# Patient Record
Sex: MALE | Race: WHITE | Employment: FULL TIME | URBAN - METROPOLITAN AREA
[De-identification: names, ages, dates, MRNs, and addresses within clinical notes are randomized per-mention and may not be internally consistent; named-entity substitution may affect disease eponyms.]

---

## 2018-07-02 ENCOUNTER — ANESTHESIA EVENT (OUTPATIENT)
Dept: SURGERY | Age: 28
End: 2018-07-02
Payer: COMMERCIAL

## 2018-07-03 ENCOUNTER — HOSPITAL ENCOUNTER (OUTPATIENT)
Age: 28
Setting detail: OUTPATIENT SURGERY
Discharge: HOME | End: 2018-07-03
Attending: ORTHOPAEDIC SURGERY | Admitting: ORTHOPAEDIC SURGERY
Payer: COMMERCIAL

## 2018-07-03 ENCOUNTER — APPOINTMENT (OUTPATIENT)
Dept: GENERAL RADIOLOGY | Age: 28
End: 2018-07-03
Attending: ORTHOPAEDIC SURGERY
Payer: COMMERCIAL

## 2018-07-03 ENCOUNTER — ANESTHESIA (OUTPATIENT)
Dept: SURGERY | Age: 28
End: 2018-07-03
Payer: COMMERCIAL

## 2018-07-03 VITALS
SYSTOLIC BLOOD PRESSURE: 129 MMHG | DIASTOLIC BLOOD PRESSURE: 81 MMHG | OXYGEN SATURATION: 96 % | TEMPERATURE: 97.6 F | RESPIRATION RATE: 16 BRPM | HEART RATE: 85 BPM | BODY MASS INDEX: 25.84 KG/M2 | WEIGHT: 175 LBS

## 2018-07-03 DIAGNOSIS — G89.18 POST-OP PAIN: Primary | ICD-10-CM

## 2018-07-03 PROCEDURE — C1713 ANCHOR/SCREW BN/BN,TIS/BN: HCPCS | Performed by: ORTHOPAEDIC SURGERY

## 2018-07-03 PROCEDURE — 77030020143 HC AIRWY LARYN INTUB CGAS -A: Performed by: ANESTHESIOLOGY

## 2018-07-03 PROCEDURE — 77030003602 HC NDL NRV BLK BBMI -B: Performed by: ANESTHESIOLOGY

## 2018-07-03 PROCEDURE — 74011000250 HC RX REV CODE- 250

## 2018-07-03 PROCEDURE — 77030003737 HC BIT DRL ACMD -C: Performed by: ORTHOPAEDIC SURGERY

## 2018-07-03 PROCEDURE — 76060000035 HC ANESTHESIA 2 TO 2.5 HR: Performed by: ORTHOPAEDIC SURGERY

## 2018-07-03 PROCEDURE — 77030002933 HC SUT MCRYL J&J -A: Performed by: ORTHOPAEDIC SURGERY

## 2018-07-03 PROCEDURE — 74011250636 HC RX REV CODE- 250/636

## 2018-07-03 PROCEDURE — 76210000020 HC REC RM PH II FIRST 0.5 HR: Performed by: ORTHOPAEDIC SURGERY

## 2018-07-03 PROCEDURE — 77030018836 HC SOL IRR NACL ICUM -A: Performed by: ORTHOPAEDIC SURGERY

## 2018-07-03 PROCEDURE — 77030016570 HC BLNKT BAIR HGGR 3M -B: Performed by: ANESTHESIOLOGY

## 2018-07-03 PROCEDURE — 74011250636 HC RX REV CODE- 250/636: Performed by: ORTHOPAEDIC SURGERY

## 2018-07-03 PROCEDURE — 76942 ECHO GUIDE FOR BIOPSY: CPT | Performed by: ORTHOPAEDIC SURGERY

## 2018-07-03 PROCEDURE — 74011250636 HC RX REV CODE- 250/636: Performed by: ANESTHESIOLOGY

## 2018-07-03 PROCEDURE — 77030002966 HC SUT PDS J&J -A: Performed by: ORTHOPAEDIC SURGERY

## 2018-07-03 PROCEDURE — 77030032490 HC SLV COMPR SCD KNE COVD -B: Performed by: ORTHOPAEDIC SURGERY

## 2018-07-03 PROCEDURE — 73000 X-RAY EXAM OF COLLAR BONE: CPT

## 2018-07-03 PROCEDURE — 77030018673: Performed by: ORTHOPAEDIC SURGERY

## 2018-07-03 PROCEDURE — 76010000171 HC OR TIME 2 TO 2.5 HR INTENSV-TIER 1: Performed by: ORTHOPAEDIC SURGERY

## 2018-07-03 PROCEDURE — 76210000063 HC OR PH I REC FIRST 0.5 HR: Performed by: ORTHOPAEDIC SURGERY

## 2018-07-03 PROCEDURE — 77030011640 HC PAD GRND REM COVD -A: Performed by: ORTHOPAEDIC SURGERY

## 2018-07-03 PROCEDURE — 76010010054 HC POST OP PAIN BLOCK: Performed by: ORTHOPAEDIC SURGERY

## 2018-07-03 DEVICE — DBX MIX, 2.5CC
Type: IMPLANTABLE DEVICE | Site: CLAVICLE | Status: FUNCTIONAL
Brand: DBX®

## 2018-07-03 DEVICE — 3.5MM X 16MM NON-LOCKING HEXALOBE SCREW
Type: IMPLANTABLE DEVICE | Site: CLAVICLE | Status: FUNCTIONAL
Brand: ACUMED

## 2018-07-03 DEVICE — 3.5MM X 12MM LOCKING HEXALOBE SCREW
Type: IMPLANTABLE DEVICE | Site: CLAVICLE | Status: FUNCTIONAL
Brand: ACUMED

## 2018-07-03 DEVICE — 3.5MM X 14MM LOCKING HEXALOBE SCREW
Type: IMPLANTABLE DEVICE | Site: CLAVICLE | Status: FUNCTIONAL
Brand: ACUMED

## 2018-07-03 DEVICE — 3.5MM X 12MM NON-LOCKING HEXALOBE SCREW
Type: IMPLANTABLE DEVICE | Site: CLAVICLE | Status: FUNCTIONAL
Brand: ACUMED

## 2018-07-03 DEVICE — NRW PROF CLAVICLE PLATE, 8-HOLE STR RT
Type: IMPLANTABLE DEVICE | Site: CLAVICLE | Status: FUNCTIONAL
Brand: ACUMED

## 2018-07-03 RX ORDER — MIDAZOLAM HYDROCHLORIDE 1 MG/ML
2 INJECTION, SOLUTION INTRAMUSCULAR; INTRAVENOUS
Status: DISCONTINUED | OUTPATIENT
Start: 2018-07-03 | End: 2018-07-03 | Stop reason: HOSPADM

## 2018-07-03 RX ORDER — KETOROLAC TROMETHAMINE 10 MG/1
10 TABLET, FILM COATED ORAL EVERY 6 HOURS
Qty: 15 TAB | Refills: 0 | Status: SHIPPED | OUTPATIENT
Start: 2018-07-03

## 2018-07-03 RX ORDER — SODIUM CHLORIDE 0.9 % (FLUSH) 0.9 %
5-10 SYRINGE (ML) INJECTION AS NEEDED
Status: DISCONTINUED | OUTPATIENT
Start: 2018-07-03 | End: 2018-07-03 | Stop reason: HOSPADM

## 2018-07-03 RX ORDER — MIDAZOLAM HYDROCHLORIDE 1 MG/ML
2 INJECTION, SOLUTION INTRAMUSCULAR; INTRAVENOUS ONCE
Status: COMPLETED | OUTPATIENT
Start: 2018-07-03 | End: 2018-07-03

## 2018-07-03 RX ORDER — FENTANYL CITRATE 50 UG/ML
INJECTION, SOLUTION INTRAMUSCULAR; INTRAVENOUS AS NEEDED
Status: DISCONTINUED | OUTPATIENT
Start: 2018-07-03 | End: 2018-07-03 | Stop reason: HOSPADM

## 2018-07-03 RX ORDER — SODIUM CHLORIDE, SODIUM LACTATE, POTASSIUM CHLORIDE, CALCIUM CHLORIDE 600; 310; 30; 20 MG/100ML; MG/100ML; MG/100ML; MG/100ML
100 INJECTION, SOLUTION INTRAVENOUS CONTINUOUS
Status: DISCONTINUED | OUTPATIENT
Start: 2018-07-03 | End: 2018-07-03 | Stop reason: HOSPADM

## 2018-07-03 RX ORDER — PROPOFOL 10 MG/ML
INJECTION, EMULSION INTRAVENOUS AS NEEDED
Status: DISCONTINUED | OUTPATIENT
Start: 2018-07-03 | End: 2018-07-03 | Stop reason: HOSPADM

## 2018-07-03 RX ORDER — BUPIVACAINE HYDROCHLORIDE AND EPINEPHRINE 5; 5 MG/ML; UG/ML
INJECTION, SOLUTION EPIDURAL; INTRACAUDAL; PERINEURAL AS NEEDED
Status: DISCONTINUED | OUTPATIENT
Start: 2018-07-03 | End: 2018-07-03 | Stop reason: HOSPADM

## 2018-07-03 RX ORDER — FENTANYL CITRATE 50 UG/ML
100 INJECTION, SOLUTION INTRAMUSCULAR; INTRAVENOUS ONCE
Status: COMPLETED | OUTPATIENT
Start: 2018-07-03 | End: 2018-07-03

## 2018-07-03 RX ORDER — SODIUM CHLORIDE 0.9 % (FLUSH) 0.9 %
5-10 SYRINGE (ML) INJECTION EVERY 8 HOURS
Status: DISCONTINUED | OUTPATIENT
Start: 2018-07-03 | End: 2018-07-03 | Stop reason: HOSPADM

## 2018-07-03 RX ORDER — DIPHENHYDRAMINE HYDROCHLORIDE 50 MG/ML
12.5 INJECTION, SOLUTION INTRAMUSCULAR; INTRAVENOUS
Status: DISCONTINUED | OUTPATIENT
Start: 2018-07-03 | End: 2018-07-03 | Stop reason: HOSPADM

## 2018-07-03 RX ORDER — ACETAMINOPHEN 500 MG
1000 TABLET ORAL ONCE
Status: DISCONTINUED | OUTPATIENT
Start: 2018-07-03 | End: 2018-07-03 | Stop reason: HOSPADM

## 2018-07-03 RX ORDER — ONDANSETRON 2 MG/ML
INJECTION INTRAMUSCULAR; INTRAVENOUS AS NEEDED
Status: DISCONTINUED | OUTPATIENT
Start: 2018-07-03 | End: 2018-07-03 | Stop reason: HOSPADM

## 2018-07-03 RX ORDER — OXYCODONE HYDROCHLORIDE 5 MG/1
10 TABLET ORAL
Status: DISCONTINUED | OUTPATIENT
Start: 2018-07-03 | End: 2018-07-03 | Stop reason: HOSPADM

## 2018-07-03 RX ORDER — EPHEDRINE SULFATE 50 MG/ML
INJECTION, SOLUTION INTRAVENOUS AS NEEDED
Status: DISCONTINUED | OUTPATIENT
Start: 2018-07-03 | End: 2018-07-03 | Stop reason: HOSPADM

## 2018-07-03 RX ORDER — LIDOCAINE HYDROCHLORIDE 20 MG/ML
INJECTION, SOLUTION EPIDURAL; INFILTRATION; INTRACAUDAL; PERINEURAL AS NEEDED
Status: DISCONTINUED | OUTPATIENT
Start: 2018-07-03 | End: 2018-07-03 | Stop reason: HOSPADM

## 2018-07-03 RX ORDER — DEXAMETHASONE SODIUM PHOSPHATE 4 MG/ML
INJECTION, SOLUTION INTRA-ARTICULAR; INTRALESIONAL; INTRAMUSCULAR; INTRAVENOUS; SOFT TISSUE AS NEEDED
Status: DISCONTINUED | OUTPATIENT
Start: 2018-07-03 | End: 2018-07-03 | Stop reason: HOSPADM

## 2018-07-03 RX ORDER — HYDROMORPHONE HYDROCHLORIDE 2 MG/1
2-4 TABLET ORAL
Qty: 30 TAB | Refills: 0 | Status: SHIPPED | OUTPATIENT
Start: 2018-07-03

## 2018-07-03 RX ORDER — LIDOCAINE HYDROCHLORIDE 10 MG/ML
0.1 INJECTION INFILTRATION; PERINEURAL AS NEEDED
Status: DISCONTINUED | OUTPATIENT
Start: 2018-07-03 | End: 2018-07-03 | Stop reason: HOSPADM

## 2018-07-03 RX ORDER — CEFAZOLIN SODIUM/WATER 2 G/20 ML
2 SYRINGE (ML) INTRAVENOUS ONCE
Status: COMPLETED | OUTPATIENT
Start: 2018-07-03 | End: 2018-07-03

## 2018-07-03 RX ORDER — FLUMAZENIL 0.1 MG/ML
0.2 INJECTION INTRAVENOUS
Status: DISCONTINUED | OUTPATIENT
Start: 2018-07-03 | End: 2018-07-03 | Stop reason: HOSPADM

## 2018-07-03 RX ORDER — OXYCODONE HYDROCHLORIDE 5 MG/1
5 TABLET ORAL
Status: DISCONTINUED | OUTPATIENT
Start: 2018-07-03 | End: 2018-07-03 | Stop reason: HOSPADM

## 2018-07-03 RX ORDER — NALOXONE HYDROCHLORIDE 0.4 MG/ML
0.2 INJECTION, SOLUTION INTRAMUSCULAR; INTRAVENOUS; SUBCUTANEOUS AS NEEDED
Status: DISCONTINUED | OUTPATIENT
Start: 2018-07-03 | End: 2018-07-03 | Stop reason: HOSPADM

## 2018-07-03 RX ORDER — HYDROMORPHONE HYDROCHLORIDE 2 MG/ML
0.5 INJECTION, SOLUTION INTRAMUSCULAR; INTRAVENOUS; SUBCUTANEOUS
Status: DISCONTINUED | OUTPATIENT
Start: 2018-07-03 | End: 2018-07-03 | Stop reason: HOSPADM

## 2018-07-03 RX ADMIN — Medication 2 G: at 11:00

## 2018-07-03 RX ADMIN — DEXAMETHASONE SODIUM PHOSPHATE 10 MG: 4 INJECTION, SOLUTION INTRA-ARTICULAR; INTRALESIONAL; INTRAMUSCULAR; INTRAVENOUS; SOFT TISSUE at 11:20

## 2018-07-03 RX ADMIN — MIDAZOLAM HYDROCHLORIDE 2 MG: 1 INJECTION, SOLUTION INTRAMUSCULAR; INTRAVENOUS at 10:17

## 2018-07-03 RX ADMIN — EPHEDRINE SULFATE 5 MG: 50 INJECTION, SOLUTION INTRAVENOUS at 11:27

## 2018-07-03 RX ADMIN — FENTANYL CITRATE 100 MCG: 50 INJECTION INTRAMUSCULAR; INTRAVENOUS at 10:17

## 2018-07-03 RX ADMIN — LIDOCAINE HYDROCHLORIDE 20 MG: 20 INJECTION, SOLUTION EPIDURAL; INFILTRATION; INTRACAUDAL; PERINEURAL at 11:05

## 2018-07-03 RX ADMIN — ONDANSETRON 4 MG: 2 INJECTION INTRAMUSCULAR; INTRAVENOUS at 12:28

## 2018-07-03 RX ADMIN — SODIUM CHLORIDE, SODIUM LACTATE, POTASSIUM CHLORIDE, AND CALCIUM CHLORIDE 100 ML/HR: 600; 310; 30; 20 INJECTION, SOLUTION INTRAVENOUS at 09:00

## 2018-07-03 RX ADMIN — BUPIVACAINE HYDROCHLORIDE AND EPINEPHRINE 30 ML: 5; 5 INJECTION, SOLUTION EPIDURAL; INTRACAUDAL; PERINEURAL at 10:22

## 2018-07-03 RX ADMIN — PROPOFOL 200 MG: 10 INJECTION, EMULSION INTRAVENOUS at 11:05

## 2018-07-03 RX ADMIN — FENTANYL CITRATE 25 MCG: 50 INJECTION, SOLUTION INTRAMUSCULAR; INTRAVENOUS at 11:35

## 2018-07-03 NOTE — DISCHARGE INSTRUCTIONS
POST OP INSTRUCTIONS      1. Patient has appointment for 7/13/18 at9:10 AM at the [facility]. 2.  For problems call Dr Brenton Ruiz, [facility],  [phone]          Appointments only,  [phone]    3. Ice and elevate the surgical site. 4.  Leave Honey comb dressing on and shower. TYPICAL SIDE EFFECTS OF PAIN MEDICATION:  *    Constipation: Drink lots of fluids. Over the counter stool softener if needed. *    Nausea: Take pain medication with food. Call your doctor with persistent nausea. ACTIVITY  · As tolerated and as directed by your doctor. · Bathe or shower as directed by your doctor. DIET  · Day of surgery: Clear liquids until no nausea or vomiting; small portion, light diet [place] foods (ex: baked chicken, plain rice, grits, scrambled eggs, toast). Nothing greasy, fried or spicy today. · Advance to regular diet on second day, unless your doctor orders otherwise. · If nausea and vomiting continues, call your doctor. PAIN  · Take pain medication as directed by your doctor. · DO NOT take aspirin or blood thinners unless directed by your doctor. CALL YOUR DOCTOR IF    s Call your doctor if pain is NOT relieved by medication.   s Excessive bleeding that does not stop after holding pressure over the area  · Temperature of 101 degrees F or above  · Excessive redness, swelling or bruising, and/ or green or yellow, smelly discharge from incision    AFTER ANESTHESIA   · For the first 24 hours: DO NOT Drive, Drink alcoholic beverages, or Make important decisions. · Be aware of dizziness following anesthesia and while taking pain medication.        DISCHARGE SUMMARY from Nurse    PATIENT INSTRUCTIONS:    After general anesthesia or intravenous sedation, for 24 hours or while taking prescription Narcotics:  · Limit your activities  · Do not drive and operate hazardous machinery  · Do not make important personal or business decisions  · Do  not drink alcoholic beverages  · If you have not urinated within 8 hours after discharge, please contact your surgeon on call. *  Please give a list of your current medications to your Primary Care Provider. *  Please update this list whenever your medications are discontinued, doses are      changed, or new medications (including over-the-counter products) are added. *  Please carry medication information at all times in case of emergency situations. Preventing Infection at Home  We care about preventing infection and avoiding the spread of germs - not only when you are in the hospital but also when you return home. When you return home from the hospital, its important to take the following steps to help prevent infection and avoid spreading germs that could infect you and others. Ask everyone in your home to follow these guidelines, too. Clean Your Hands  · Clean your hands whenever your hands are visibly dirty, before you eat, before or after touching your mouth, nose or eyes, and before preparing food. Clean them after contact with body fluids, using the restroom, touching animals or changing diapers. · When washing hands, wet them with warm water and work up a lather. Rub hands for at least 15 seconds, then rinse them and pat them dry with a clean towel or paper towel. · When using hand sanitizers, it should take about 15 seconds to rub your hands dry. If not, you probably didnt apply enough . Cover Your Sneeze or Cough  Germs are released into the air whenever you sneeze or cough. To prevent the spread of infection:  · Turn away from other people before coughing or sneezing. · Cover your mouth or nose with a tissue when you cough or sneeze. Put the tissue in the trash. · If you dont have a tissue, cough or sneeze into your upper sleeve, not your hands. · Always clean your hands after coughing or sneezing.     Care for Wounds  Your skin is your bodys first line of defense against germs, but an open wound leaves an easy way for germs to enter your body. To prevent infection:  · Clean your hands before and after changing wound dressings, and wear gloves to change dressings if recommended by your doctor. · Take special care with IV lines or other devices inserted into the body. If you must touch them, clean your hands first.  · Follow any specific instructions from your doctor to care for your wounds. Contact your doctor if you experience any signs of infection, such as fever or increased redness at the surgical or wound site. Keep a Clean Home  · Clean or wipe commonly touched hard surfaces like door handles, sinks, tabletops, phones and TV remotes. · Use products labeled disinfectant to kill harmful bacteria and viruses. · Use a clean cloth or paper towel to clean and dry surfaces. Wiping surfaces with a dirty dishcloth, sponge or towel will only spread germs. · Never share toothbrushes, robertson, drinking glasses, utensils, razor blades, face cloths or bath towels to avoid spreading germs. · Be sure that the linens that you sleep on are clean. · Keep pets away from wounds and wash your hands after touching pets, their toys or bedding. We care about you and your health. Remember, preventing infections is a team effort between you, your family, friends and health care providers. These are general instructions for a healthy lifestyle:    No smoking/ No tobacco products/ Avoid exposure to second hand smoke    Surgeon General's Warning:  Quitting smoking now greatly reduces serious risk to your health.     Obesity, smoking, and sedentary lifestyle greatly increases your risk for illness    A healthy diet, regular physical exercise & weight monitoring are important for maintaining a healthy lifestyle    You may be retaining fluid if you have a history of heart failure or if you experience any of the following symptoms:  Weight gain of 3 pounds or more overnight or 5 pounds in a week, increased swelling in our hands or feet or shortness of breath while lying flat in bed. Please call your doctor as soon as you notice any of these symptoms; do not wait until your next office visit. Recognize signs and symptoms of STROKE:    F-face looks uneven    A-arms unable to move or move unevenly    S-speech slurred or non-existent    T-time-call 911 as soon as signs and symptoms begin-DO NOT go       Back to bed or wait to see if you get better-TIME IS BRAIN.

## 2018-07-03 NOTE — IP AVS SNAPSHOT
[address] 
[phone] Patient: Sendy Taylor MRN: [id number] About your hospitalization You were admitted on:  July 3, 2018 You last received care in the:  [facility] OP PACU You were discharged on:  July 3, 2018 Why you were hospitalized Your primary diagnosis was:  Not on File Follow-up Information Follow up With Details Comments Contact Info Link MD Annmarie  Keep scheduled follow up appointment. Zack Zheng [facility] [id number] 
[phone] None   None (395) Patient stated that they have no PCP Discharge Orders None A check margaret indicates which time of day the medication should be taken. My Medications START taking these medications Instructions Each Dose to Equal  
 Morning Noon Evening Bedtime HYDROmorphone 2 mg tablet Commonly known as:  DILAUDID Your last dose was: Your next dose is: Take 1-2 Tabs by mouth every four (4) hours as needed for Pain. Max Daily Amount: 24 mg.  
 2-4 mg  
    
   
   
   
  
 ketorolac 10 mg tablet Commonly known as:  TORADOL Your last dose was: Your next dose is: Take 1 Tab by mouth every six (6) hours. 10 mg CONTINUE taking these medications Instructions Each Dose to Equal  
 Morning Noon Evening Bedtime ALLEGRA PO Your last dose was: Your next dose is: Take  by mouth daily. STOP taking these medications ADVIL 200 mg tablet Generic drug:  ibuprofen Where to Get Your Medications Information on where to get these meds will be given to you by the nurse or doctor. ! Ask your nurse or doctor about these medications HYDROmorphone 2 mg tablet  
 ketorolac 10 mg tablet Opioid Education Prescription Opioids: What You Need to Know: 
 
 
3. Ice and elevate the surgical site. 4.  Leave Honey comb dressing on and shower. TYPICAL SIDE EFFECTS OF PAIN MEDICATION: 
*    Constipation: Drink lots of fluids. Over the counter stool softener if needed. *    Nausea: Take pain medication with food. Call your doctor with persistent nausea. ACTIVITY · As tolerated and as directed by your doctor. · Bathe or shower as directed by your doctor. DIET 
· Day of surgery: Clear liquids until no nausea or vomiting; small portion, light diet [place] foods (ex: baked chicken, plain rice, grits, scrambled eggs, toast). Nothing greasy, fried or spicy today. · Advance to regular diet on second day, unless your doctor orders otherwise. · If nausea and vomiting continues, call your doctor. PAIN 
· Take pain medication as directed by your doctor. · DO NOT take aspirin or blood thinners unless directed by your doctor. CALL YOUR DOCTOR IF   
s Call your doctor if pain is NOT relieved by medication.  
s Excessive bleeding that does not stop after holding pressure over the area · Temperature of 101 degrees F or above · Excessive redness, swelling or bruising, and/ or green or yellow, smelly discharge from incision AFTER ANESTHESIA · For the first 24 hours: DO NOT Drive, Drink alcoholic beverages, or Make important decisions. · Be aware of dizziness following anesthesia and while taking pain medication. DISCHARGE SUMMARY from Nurse PATIENT INSTRUCTIONS: 
 
After general anesthesia or intravenous sedation, for 24 hours or while taking prescription Narcotics: · Limit your activities · Do not drive and operate hazardous machinery · Do not make important personal or business decisions · Do  not drink alcoholic beverages · If you have not urinated within 8 hours after discharge, please contact your surgeon on call. *  Please give a list of your current medications to your Primary Care Provider. *  Please update this list whenever your medications are discontinued, doses are 
    changed, or new medications (including over-the-counter products) are added. *  Please carry medication information at all times in case of emergency situations. Preventing Infection at Home We care about preventing infection and avoiding the spread of germs  not only when you are in the hospital but also when you return home. When you return home from the hospital, its important to take the following steps to help prevent infection and avoid spreading germs that could infect you and others. Ask everyone in your home to follow these guidelines, too. Clean Your Hands · Clean your hands whenever your hands are visibly dirty, before you eat, before or after touching your mouth, nose or eyes, and before preparing food. Clean them after contact with body fluids, using the restroom, touching animals or changing diapers. · When washing hands, wet them with warm water and work up a lather. Rub hands for at least 15 seconds, then rinse them and pat them dry with a clean towel or paper towel. · When using hand sanitizers, it should take about 15 seconds to rub your hands dry. If not, you probably didnt apply enough . Cover Your Sneeze or Cough Germs are released into the air whenever you sneeze or cough. To prevent the spread of infection: · Turn away from other people before coughing or sneezing. · Cover your mouth or nose with a tissue when you cough or sneeze. Put the tissue in the trash. · If you dont have a tissue, cough or sneeze into your upper sleeve, not your hands. · Always clean your hands after coughing or sneezing. Care for Wounds Your skin is your bodys first line of defense against germs, but an open wound leaves an easy way for germs to enter your body. To prevent infection: · Clean your hands before and after changing wound dressings, and wear gloves to change dressings if recommended by your doctor. · Take special care with IV lines or other devices inserted into the body. If you must touch them, clean your hands first. 
· Follow any specific instructions from your doctor to care for your wounds. Contact your doctor if you experience any signs of infection, such as fever or increased redness at the surgical or wound site. Keep a [place] [address] · Clean or wipe commonly touched hard surfaces like door handles, sinks, tabletops, phones and TV remotes. · Use products labeled disinfectant to kill harmful bacteria and viruses. · Use a clean cloth or paper towel to clean and dry surfaces. Wiping surfaces with a dirty dishcloth, sponge or towel will only spread germs. · Never share toothbrushes, robertson, drinking glasses, utensils, razor blades, face cloths or bath towels to avoid spreading germs. · Be sure that the linens that you sleep on are clean. · Keep pets away from wounds and wash your hands after touching pets, their toys or bedding. We care about you and your health. Remember, preventing infections is a team effort between you, your family, friends and health care providers. These are general instructions for a healthy lifestyle: No smoking/ No tobacco products/ Avoid exposure to second hand smoke Surgeon General's Warning:  Quitting smoking now greatly reduces serious risk to your health. Obesity, smoking, and sedentary lifestyle greatly increases your risk for illness A healthy diet, regular physical exercise & weight monitoring are important for maintaining a healthy lifestyle You may be retaining fluid if you have a history of heart failure or if you experience any of the following symptoms:  Weight gain of 3 pounds or more overnight or 5 pounds in a week, increased swelling in our hands or feet or shortness of breath while lying flat in bed. Please call your doctor as soon as you notice any of these symptoms; do not wait until your next office visit. Recognize signs and symptoms of STROKE: 
 
F-face looks uneven A-arms unable to move or move unevenly S-speech slurred or non-existent T-time-call 911 as soon as signs and symptoms begin-DO NOT go Back to bed or wait to see if you get better-TIME IS BRAIN. Introducing [facility]! Rosibel Georges introduces [employer] patient portal. Now you can access parts of your medical record, email your doctor's office, and request medication refills online. 1. In your internet browser, go to [email or web address] 2. Click on the First Time User? Click Here link in the Sign In box. You will see the New Member Sign Up page. 3. Enter your [employer] Access Code exactly as it appears below. You will not need to use this code after youve completed the sign-up process. If you do not sign up before the expiration date, you must request a new code. · [employer] Access Code: [email or web address] Expires: 9/27/2018  2:01 PM 
 
4. Enter the last four digits of your Social Security Number (xxxx) and Date of Birth (mm/dd/yyyy) as indicated and click Submit. You will be taken to the next sign-up page. 5. Create a [employer] ID. This will be your [employer] login ID and cannot be changed, so think of one that is secure and easy to remember. 6. Create a [employer] password. You can change your password at any time. 7. Enter your Password Reset Question and Answer. This can be used at a later time if you forget your password. 8. Enter your e-mail address. You will receive e-mail notification when new information is available in [address] 9. Click Sign Up. You can now view and download portions of your medical record. 10. Click the Download Summary menu link to download a portable copy of your medical information. If you have questions, please visit the Frequently Asked Questions section of the [employer] website. Remember, [employer] is NOT to be used for urgent needs. For medical emergencies, dial 911. Now available from your iPhone and Android! Introducing Sang Welsh As a [employer] patient, I wanted to make you aware of our electronic visit tool called Sang Anitha. [employer] allows you to connect within minutes with a medical provider 24 hours a day, seven days a week via a mobile device or tablet or logging into a secure website from your computer. You can access Sang Welsh from anywhere in the [place]. A virtual visit might be right for you when you have a simple condition and feel like you just dont want to get out of bed, or cant get away from work for an appointment, when your regular [facility] provider is not available (evenings, weekends or holidays), or when youre out of town and need minor care. Electronic visits cost only $49 and if the [employer] provider determines a prescription is needed to treat your condition, one can be electronically transmitted to a nearby pharmacy*. Please take a moment to enroll today if you have not already done so. The enrollment process is free and takes just a few minutes. To enroll, please download the [employer] chana to your tablet or phone, or visit [email or web address] to enroll on your computer.    
And, as an [address] [place] [address] patient with a [employer] account, the results of your visits will be scanned into your electronic medical record and your primary care provider will be able to view the scanned results. We urge you to continue to see your regular [facility] provider for your ongoing medical care. And while your primary care provider may not be the one available when you seek a Sang Welsh virtual visit, the peace of mind you get from getting a real diagnosis real time can be priceless. For more information on Sang Moseleyfin, view our Frequently Asked Questions (FAQs) at [email or web address]. Sincerely, 
 
Elvia Wynn MD 
Chief Medical Officer [employer] *:  certain medications cannot be prescribed via Sang [employer] in [employer] Unresulted Labs-Please follow up with your PCP about these lab tests Order Current Status [place] XR TECHNOLOGIST SERVICE In process XR CLAVICLE RT In process Providers Seen During Your Hospitalization Provider Specialty Primary office phone Ladarius Elliott., MD Orthopedic Surgery [phone] Your Primary Care Physician (PCP) Primary Care Physician Office Phone Office Fax NONE ** None ** ** None ** You are allergic to the following No active allergies Recent Documentation Weight BMI Smoking Status 79.4 kg 25.84 kg/m2 Never Smoker Emergency Contacts Name Discharge Info Relation Home Work Mobile José Miguel Sotoienfarhan [18] [phone] Patient Belongings The following personal items are in your possession at time of discharge: 
  Dental Appliances: None Please provide this summary of care documentation to your next provider. Signatures-by signing, you are acknowledging that this After Visit Summary has been reviewed with you and you have received a copy. Patient Signature:  ____________________________________________________________ Date:  ____________________________________________________________  
  
Turkmen Pih Provider Signature:  ____________________________________________________________ Date:  ____________________________________________________________

## 2018-07-03 NOTE — ANESTHESIA PROCEDURE NOTES
Peripheral Block    Start time: 7/3/2018 10:17 AM  End time: 7/3/2018 10:22 AM  Performed by: Phill Akbar  Authorized by: Phill Akbar       Pre-procedure: Indications: at surgeon's request and post-op pain management    Preanesthetic Checklist: patient identified, risks and benefits discussed, site marked, timeout performed, anesthesia consent given and patient being monitored      Block Type:   Block Type:   Interscalene  Laterality:  Right  Monitoring:  Continuous pulse ox, frequent vital sign checks, heart rate, responsive to questions and oxygen  Injection Technique:  Single shot  Procedures: ultrasound guided    Prep: chlorhexidine    Location:  Interscalene  Needle Type:  Stimuplex  Needle Gauge:  22 G  Needle Localization:  Anatomical landmarks, infiltration and ultrasound guidance  Medication Injected:  0.5%  bupivacaine  Adds:  Epi 1:200K  Volume (mL):  30    Assessment:  Number of attempts:  1  Injection Assessment:  Incremental injection every 5 mL, negative aspiration for CSF, local visualized surrounding nerve on ultrasound, negative aspiration for blood, no intravascular symptoms, no paresthesia and ultrasound image on chart  Patient tolerance:  Patient tolerated the procedure well with no immediate complications

## 2018-07-03 NOTE — ANESTHESIA POSTPROCEDURE EVALUATION
Post-Anesthesia Evaluation and Assessment    Patient: Marcelo Giles MRN: [id number]  SSN: [id number]    YOB: 1990  Age: 32 y.o. Sex: male       Cardiovascular Function/Vital Signs  Visit Vitals    /77    Pulse 74    Temp 36.4 °C (97.6 °F)    Resp 14    Wt 79.4 kg (175 lb)    SpO2 99%    BMI 25.84 kg/m2       Patient is status post general anesthesia for Procedure(s):  RIGHT CLAVICLE OPEN REDUCTION INTERNAL FIXATION. Nausea/Vomiting: None    Postoperative hydration reviewed and adequate. Pain:  Pain Scale 1: Numeric (0 - 10) (07/03/18 1316)  Pain Intensity 1: 0 (07/03/18 1316)   Managed    Neurological Status:   Neuro (WDL): Exceptions to WDL (drowsy) (07/03/18 1316)  Neuro  RUE Motor Response: Numbness (07/03/18 1316)   Block intact    Mental Status and Level of Consciousness: Arousable    Pulmonary Status:   O2 Device: Nasal cannula (07/03/18 1316)   Adequate oxygenation and airway patent    Complications related to anesthesia: None    Post-anesthesia assessment completed.  No concerns    Signed By: John Tavera MD     July 3, 2018

## 2018-07-03 NOTE — ANESTHESIA PREPROCEDURE EVALUATION
Anesthetic History   No history of anesthetic complications            Review of Systems / Medical History  Patient summary reviewed and pertinent labs reviewed    Pulmonary  Within defined limits                 Neuro/Psych   Within defined limits           Cardiovascular                  Exercise tolerance: >4 METS     GI/Hepatic/Renal  Within defined limits              Endo/Other  Within defined limits           Other Findings              Physical Exam    Airway  Mallampati: III  TM Distance: 4 - 6 cm  Neck ROM: normal range of motion   Mouth opening: Normal     Cardiovascular    Rhythm: regular  Rate: normal         Dental         Pulmonary  Breath sounds clear to auscultation               Abdominal         Other Findings            Anesthetic Plan    ASA: 1  Anesthesia type: general      Post-op pain plan if not by surgeon: peripheral nerve block single    Induction: Intravenous  Anesthetic plan and risks discussed with: Patient and Spouse

## 2018-07-03 NOTE — BRIEF OP NOTE
BRIEF OPERATIVE NOTE    Date of Procedure: 7/3/2018   Preoperative Diagnosis: Displaced fracture of shaft of right clavicle, subsequent encounter for fracture with routine healing [S42.973D]  Postoperative Diagnosis: RIGHT CLAVICLE FRACTURE    Procedure(s):  RIGHT CLAVICLE OPEN REDUCTION INTERNAL FIXATION  Surgeon(s) and Role:     * MOSHE Patel MD - Primary         Surgical Assistant: none    Surgical Staff:  Circ-1: Meagan Cote RN  Circ-Relief: Jorge Ashley RN  Radiology Technician: Forest Leal, RT, R, CT  Scrub Tech-1: Zoe Reeves  Scrub Tech-Relief: Laura Crawford Cortus SA  Event Time In   Incision Start 1131   Incision Close 1306     Anesthesia: General   Estimated Blood Loss: 35 cc  Specimens: * No specimens in log *   Findings: Comminuted displaced fx of the shaft of the clavicle   Complications: none  Implants:   Implant Name Type Inv.  Item Serial No.  Lot No. LRB No. Used Action   BONE GRFT SUB DBX PUTTY 2.5ML --  - [id number]  BONE GRFT SUB DBX PUTTY 2.5ML --  [id number] MUSCULOSKELETAL TRANS [id number] Right 1 Implanted   PLATE CLAV DAWNA PROF 8H STR RT --  - [id number]   PLATE CLAV DAWNA PROF 8H STR RT --    [address] [place] [address] Drive [id number] Right 1 Implanted

## 2018-07-03 NOTE — OP NOTES
[facility] REPORT    Deneen Pereyra  MR#: [id number]  : 1990  ACCOUNT #: [de-identified]   DATE OF SERVICE: 2018    PREOPERATIVE DIAGNOSIS:  Comminuted fracture of the shaft of the right clavicle. POSTOPERATIVE DIAGNOSIS:  Comminuted fracture of the shaft of the right clavicle. PROCEDURE PERFORMED:  Open reduction and internal fixation of a fracture of right clavicular shaft. SURGEON:  JONO Saba MD     ANESTHESIA:  Interscalene block and general.     PROCEDURAL NOTE:  The patient was given a preoperative interscalene block and brought to the operating room and placed under general anesthetic and placed into the semirecumbent position. The base of the neck, anterior chest and shoulder region were prepped with ChloraPrep and draped as a sterile field. Patient had been given preoperative IV Ancef prophylactically. A timeout was held identifying the patient, surgeon, procedure and operative site. Image intensifier was brought in from the opposite side of the table and the position of the fracture identified and marked with a metallic instrument. A longitudinal incision was made along the inferior aspect of the clavicle and carried down through the skin and subcutaneous tissues. Small bleeders were electrocoagulated. There was noted to be overriding of the proximal portion of the clavicle over the distal portion with shortening of 1.5 to possibly 2 cm. There was a fairly large cortical fragment that was turned vertically and this was freed from some early healing and had minimal soft tissue attachments. The fracture was able to be freed subperiosteally, first splitting the muscle fascia over the anterior aspect and reflecting it off of the bone subperiosteally. The proximal end was mobilized. Then, the distal end, which was deep to the proximal portion, was mobilized as well.   Crab claw clamps were utilized to reduce the fracture and temporarily hold it in position. An Acumed clavicular plate was selected using an 8-hole straight plate. This was positioned over the fracture and gave good alignment and position. I bent the proximal end slightly to prevent prominence of the plate in the soft tissues. The plate was positioned along the superior aspect of the fracture and it was clamped in place with the crab claw clamps. The plate was first attached with nonlocking screws in the oval holes on either side of the fracture. This was followed by further fixation with a combination of locking and nonlocking screws. Care was taken not to plunge deep through the clavicle when drilling. Where possible, elevators were passed underneath the clavicle to protect the deep structures. The position was checked at various times with the image and found to be in good alignment and position with no overly long screws. Overall, good fixation was obtained. There was a defect around the inferior aspect of the clavicle at the fracture site where the butterfly fragment had been previously located. This was filled with DBX bone putty. This was done after irrigating the wound thoroughly. The muscle fascia was then closed back using 2-0 PDS. Subcutaneous tissue was closed with 4-0 Monocryl. Steri-Strips were applied with Mastisol adhesive and then a sterile honeycomb occlusive dressing placed over the incision. The patient's arm was supported in a sling. He was transported back to the recovery room in good condition. ASSISTANT:  None. ESTIMATED BLOOD LOSS:  35 mL. COMPLICATIONS:  None. IMPLANTS:  8-hole Acumed clavicle plate. SPECIMENS REMOVED:  None. MD CAAS Murdock / Bishop Edward  D: 07/03/2018 13:43     T: 07/03/2018 14:25  JOB #: [id number]  CC: JONO Montes MD

## (undated) DEVICE — SUTURE PDS II SZ 2-0 L27IN ABSRB VLT L26MM CT-2 1/2 CIR Z333H

## (undated) DEVICE — BANDAGE COMPR SELF ADH 5 YDX6 IN TAN STRL PREMIERPRO LF

## (undated) DEVICE — STOCKINETTE ORTH W9XL36IN COT 2 PLY HLLW FOR HANDLING LMB

## (undated) DEVICE — AMD ANTIMICROBIAL GAUZE SPONGES,12 PLY USP TYPE VII, 0.2% POLYHEXAMETHYLENE BIGUANIDE HCI (PHMB): Brand: CURITY

## (undated) DEVICE — GOWN,SIRUS,NONRNF,SETINSLV,XL,20/CS: Brand: MEDLINE

## (undated) DEVICE — 2000CC GUARDIAN II: Brand: GUARDIAN

## (undated) DEVICE — DRAPE,TOP,102X53,STERILE: Brand: MEDLINE

## (undated) DEVICE — DRAPE XR C ARM 41X74IN LF --

## (undated) DEVICE — REM POLYHESIVE ADULT PATIENT RETURN ELECTRODE: Brand: VALLEYLAB

## (undated) DEVICE — DRAPE SHT 3 QTR PROXIMA 53X77 --

## (undated) DEVICE — (D)PREP SKN CHLRAPRP APPL 26ML -- CONVERT TO ITEM 371833

## (undated) DEVICE — SOLUTION IV 1000ML 0.9% SOD CHL

## (undated) DEVICE — WATERPROOF, BACTERIA PROOF DRESSING WITH ABSORBENT SEE THROUGH PAD: Brand: OPSITE POST-OP VISIBLE 15X10CM CTN 20

## (undated) DEVICE — BUTTON SWITCH PENCIL BLADE ELECTRODE, HOLSTER: Brand: EDGE

## (undated) DEVICE — MEDI-VAC YANKAUER SUCTION HANDLE W/BULBOUS TIP: Brand: CARDINAL HEALTH

## (undated) DEVICE — MASTISOL ADHESIVE LIQ 2/3ML

## (undated) DEVICE — NEEDLE HYPO 25GA L1.5IN BLU POLYPR HUB S STL REG BVL STR

## (undated) DEVICE — (D)STRIP SKN CLSR 0.5X4IN WHT --

## (undated) DEVICE — 2.8MM QUICK RELEASE DRILL: Brand: ACUMED

## (undated) DEVICE — 3M™ IOBAN™ 2 ANTIMICROBIAL INCISE DRAPE 6650EZ: Brand: IOBAN™ 2

## (undated) DEVICE — KENDALL SCD EXPRESS SLEEVES, KNEE LENGTH, MEDIUM: Brand: KENDALL SCD

## (undated) DEVICE — SYR 10ML LUER LOK 1/5ML GRAD --

## (undated) DEVICE — 2.8MM X 5" QUICK RELEASE DRILL: Brand: ACUMED

## (undated) DEVICE — SUTURE MCRYL SZ 4-0 L27IN ABSRB UD L19MM PS-2 1/2 CIR PRIM Y426H

## (undated) DEVICE — SHEET, DRAPE, SPLIT, STERILE: Brand: MEDLINE

## (undated) DEVICE — SURGICAL PROCEDURE PACK BASIC ST FRANCIS